# Patient Record
Sex: MALE | Race: WHITE | NOT HISPANIC OR LATINO | Employment: FULL TIME | URBAN - NONMETROPOLITAN AREA
[De-identification: names, ages, dates, MRNs, and addresses within clinical notes are randomized per-mention and may not be internally consistent; named-entity substitution may affect disease eponyms.]

---

## 2018-11-03 ENCOUNTER — OFFICE VISIT (OUTPATIENT)
Dept: URGENT CARE | Facility: PHYSICIAN GROUP | Age: 23
End: 2018-11-03
Payer: COMMERCIAL

## 2018-11-03 VITALS
BODY MASS INDEX: 36.96 KG/M2 | OXYGEN SATURATION: 94 % | WEIGHT: 230 LBS | TEMPERATURE: 97.8 F | SYSTOLIC BLOOD PRESSURE: 130 MMHG | HEIGHT: 66 IN | RESPIRATION RATE: 16 BRPM | DIASTOLIC BLOOD PRESSURE: 84 MMHG | HEART RATE: 76 BPM

## 2018-11-03 DIAGNOSIS — J45.21 MILD INTERMITTENT ASTHMA WITH ACUTE EXACERBATION: ICD-10-CM

## 2018-11-03 PROCEDURE — 99203 OFFICE O/P NEW LOW 30 MIN: CPT | Performed by: FAMILY MEDICINE

## 2018-11-03 RX ORDER — ALBUTEROL SULFATE 90 UG/1
AEROSOL, METERED RESPIRATORY (INHALATION)
Qty: 1 INHALER | Refills: 5 | Status: SHIPPED | OUTPATIENT
Start: 2018-11-03 | End: 2020-08-22

## 2018-11-03 NOTE — PROGRESS NOTES
Chief Complaint:    Chief Complaint   Patient presents with   • Asthma       History of Present Illness:    This is a new problem. He has long history of asthma since childhood. His asthma started acting up last night. He does not have any meds for asthma at this time. Usually all he needs is Albuterol MDI to use and he will be fine. Rarely has he needed steroid or neb treatment. He does not feel he needs either today. No fever or purulent mucus.      Review of Systems:    Constitutional: Negative for fever, chills, and diaphoresis.   Eyes: Negative for change in vision, photophobia, pain, redness, and discharge.  ENT: Negative for ear pain, ear discharge, hearing loss, tinnitus, nasal congestion, nosebleeds, and sore throat.    Respiratory: See HPI.   Cardiovascular: Negative for chest pain, palpitations, orthopnea, claudication, leg swelling, and PND.   Gastrointestinal: Negative for abdominal pain, nausea, vomiting, diarrhea, constipation, blood in stool, and melena.   Musculoskeletal: Negative for myalgias, joint pain, neck pain, and back pain.   Skin: Negative for rash and itching.       Past Medical History:    Past Medical History:   Diagnosis Date   • Asthma      Past Surgical History:    History reviewed. No pertinent surgical history.    Social History:    Social History     Social History   • Marital status: Single     Spouse name: N/A   • Number of children: N/A   • Years of education: N/A     Occupational History   • Not on file.     Social History Main Topics   • Smoking status: Never Smoker   • Smokeless tobacco: Never Used   • Alcohol use Not on file   • Drug use: Yes     Types: Marijuana   • Sexual activity: Not on file     Other Topics Concern   • Not on file     Social History Narrative   • No narrative on file     Family History:    History reviewed. No pertinent family history.    Medications:    No current outpatient prescriptions on file prior to visit.     No current facility-administered  "medications on file prior to visit.      Allergies:    No Known Allergies      Vitals:    Vitals:    11/03/18 0928   BP: 130/84   BP Location: Left arm   Patient Position: Sitting   BP Cuff Size: Adult   Pulse: 76   Resp: 16   Temp: 36.6 °C (97.8 °F)   TempSrc: Temporal   SpO2: 94%   Weight: 104.3 kg (230 lb)   Height: 1.664 m (5' 5.5\")       Physical Exam:    Constitutional: Vital signs reviewed. Appears well-developed and well-nourished. No acute distress.   Eyes: Sclera white, conjunctivae clear.   ENT: External ears normal. Hearing normal.   Neck: Neck supple.   Cardiovascular: Regular rate and rhythm. No murmur.  Pulmonary/Chest: Respirations non-labored. Mild diffuse wheezing bilaterally.  Musculoskeletal: Normal gait. No muscular atrophy or weakness.  Neurological: Alert and oriented to person, place, and time. Muscle tone normal. Coordination normal.   Psychiatric: Normal mood and affect. Behavior is normal. Judgment and thought content normal.       Assessment / Plan:    1. Mild intermittent asthma with acute exacerbation  - albuterol 108 (90 Base) MCG/ACT Aero Soln inhalation aerosol; 2 PUFFS EVERY 4 HOURS ONLY IF NEEDED FOR COUGH, WHEEZING, OR SHORTNESS OF BREATH.  Dispense: 1 Inhaler; Refill: 5      Discussed with him DDX, management options, and risks, benefits, and alternatives to treatment plan agreed upon.    Agreeable to medication prescribed.    Discussed expected course of duration, time for improvement, and to seek follow-up in Emergency Room, urgent care, or with PCP if getting worse at any time or not improving within expected time frame.  "

## 2019-01-22 ENCOUNTER — OFFICE VISIT (OUTPATIENT)
Dept: URGENT CARE | Facility: PHYSICIAN GROUP | Age: 24
End: 2019-01-22
Payer: COMMERCIAL

## 2019-01-22 VITALS
RESPIRATION RATE: 16 BRPM | WEIGHT: 237 LBS | SYSTOLIC BLOOD PRESSURE: 116 MMHG | HEIGHT: 66 IN | HEART RATE: 96 BPM | TEMPERATURE: 98 F | DIASTOLIC BLOOD PRESSURE: 72 MMHG | BODY MASS INDEX: 38.09 KG/M2 | OXYGEN SATURATION: 92 %

## 2019-01-22 DIAGNOSIS — J98.8 WHEEZING-ASSOCIATED RESPIRATORY INFECTION (WARI): ICD-10-CM

## 2019-01-22 PROCEDURE — 99214 OFFICE O/P EST MOD 30 MIN: CPT | Performed by: PHYSICIAN ASSISTANT

## 2019-01-22 RX ORDER — ALBUTEROL SULFATE 2.5 MG/3ML
2.5 SOLUTION RESPIRATORY (INHALATION) EVERY 4 HOURS PRN
Qty: 50 BULLET | Refills: 0 | Status: SHIPPED | OUTPATIENT
Start: 2019-01-22

## 2019-01-22 RX ORDER — METHYLPREDNISOLONE 4 MG/1
4 TABLET ORAL SEE ADMIN INSTRUCTIONS
Qty: 21 TAB | Refills: 0 | Status: SHIPPED | OUTPATIENT
Start: 2019-01-22 | End: 2020-08-22

## 2019-01-22 RX ORDER — AZITHROMYCIN 250 MG/1
TABLET, FILM COATED ORAL
Qty: 6 TAB | Refills: 0 | Status: SHIPPED | OUTPATIENT
Start: 2019-01-22 | End: 2020-08-22

## 2019-01-22 NOTE — PROGRESS NOTES
Chief Complaint   Patient presents with   • Shortness of Breath       HISTORY OF PRESENT ILLNESS: Patient is a 23 y.o. male who presents today because he has had increasing shortness of breath over the last 3 days.  He has a long history of asthma, has been using his albuterol inhaler but has not been helping very much.    There are no active problems to display for this patient.      Allergies:Patient has no known allergies.    Current Outpatient Prescriptions Ordered in [facility]   Medication Sig Dispense Refill   • albuterol (PROVENTIL) 2.5mg/3ml Nebu Soln solution for nebulization 3 mL by Nebulization route every four hours as needed for Shortness of Breath. 50 Bullet 0   • MethylPREDNISolone (MEDROL DOSEPAK) 4 MG Tablet Therapy Pack Take 1 Tab by mouth See Admin Instructions. 21 Tab 0   • azithromycin (ZITHROMAX) 250 MG Tab Follow package directions 6 Tab 0   • albuterol 108 (90 Base) MCG/ACT Aero Soln inhalation aerosol 2 PUFFS EVERY 4 HOURS ONLY IF NEEDED FOR COUGH, WHEEZING, OR SHORTNESS OF BREATH. 1 Inhaler 5     No current Epic-ordered facility-administered medications on file.        Past Medical History:   Diagnosis Date   • Asthma        Social History   Substance Use Topics   • Smoking status: Never Smoker   • Smokeless tobacco: Never Used   • Alcohol use Not on file       No family status information on file.   No family history on file.    ROS:  Review of Systems   Constitutional: Negative for fever, chills, weight loss and malaise/fatigue.   HENT: Negative for ear pain, nosebleeds, congestion, sore throat and neck pain.    Eyes: Negative for blurred vision.   Respiratory: Positive for cough, sputum production, shortness of breath and wheezing.    Cardiovascular: Negative for chest pain, palpitations, orthopnea and leg swelling.   Gastrointestinal: Negative for heartburn, nausea, vomiting and abdominal pain.   Genitourinary: Negative for dysuria, urgency and frequency.     Exam:  Blood pressure 116/72,  "pulse 96, temperature 36.7 °C (98 °F), temperature source Temporal, resp. rate 16, height 1.664 m (5' 5.5\"), weight 107.5 kg (237 lb), SpO2 92 %.  General:  Well nourished, well developed male in NAD  Head:Normocephalic, atraumatic  Eyes: PERRLA, EOM within normal limits, no conjunctival injection, no scleral icterus, visual fields and acuity grossly intact.  Ears: Normal shape and symmetry, no tenderness, no discharge. External canals are without any significant edema or erythema. Tympanic membranes are without any inflammation, no effusion. Gross auditory acuity is intact  Nose: Symmetrical without tenderness, no discharge.  Mouth: reasonable hygiene, no erythema exudates or tonsillar enlargement.  Neck: no masses, range of motion within normal limits, no tracheal deviation. No obvious thyroid enlargement.  Pulmonary: chest is symmetrical with respiration, is diffuse wheezing bilaterally, scattered rhonchi bilaterally, not clearing with cough   cardiovascular: regular rate and rhythm without murmurs, rubs, or gallops.  Extremities: no clubbing, cyanosis, or edema.    Please note that this dictation was created using voice recognition software. I have made every reasonable attempt to correct obvious errors, but I expect that there are errors of grammar and possibly content that I did not discover before finalizing the note.    Assessment/Plan:  1. Wheezing-associated respiratory infection (WARI)  albuterol (PROVENTIL) 2.5mg/3ml Nebu Soln solution for nebulization    MethylPREDNISolone (MEDROL DOSEPAK) 4 MG Tablet Therapy Pack    azithromycin (ZITHROMAX) 250 MG Tab       Followup with primary care in the next 7-10 days if not significantly improving, return to the urgent care or go to the emergency room sooner for any worsening of symptoms.       "

## 2019-01-22 NOTE — LETTER
January 22, 2019         Patient: Jose Alejandro Kilpatrick   YOB: 1995   Date of Visit: 1/22/2019           To Whom it May Concern:    Jose Alejandro Kilpatrick was seen in my clinic on 1/22/2019. He may return to work on 01/23/2019, please excuse any recent absences.    If you have any questions or concerns, please don't hesitate to call.        Sincerely,           Ga Ramos P.A.-C.  Electronically Signed

## 2020-08-22 ENCOUNTER — OFFICE VISIT (OUTPATIENT)
Dept: URGENT CARE | Facility: PHYSICIAN GROUP | Age: 25
End: 2020-08-22
Payer: COMMERCIAL

## 2020-08-22 VITALS
RESPIRATION RATE: 18 BRPM | DIASTOLIC BLOOD PRESSURE: 88 MMHG | SYSTOLIC BLOOD PRESSURE: 140 MMHG | BODY MASS INDEX: 33.75 KG/M2 | TEMPERATURE: 97.7 F | OXYGEN SATURATION: 97 % | HEART RATE: 60 BPM | WEIGHT: 210 LBS | HEIGHT: 66 IN

## 2020-08-22 DIAGNOSIS — J45.21 MILD INTERMITTENT ASTHMA WITH ACUTE EXACERBATION: ICD-10-CM

## 2020-08-22 PROCEDURE — 99214 OFFICE O/P EST MOD 30 MIN: CPT | Performed by: FAMILY MEDICINE

## 2020-08-22 RX ORDER — ALBUTEROL SULFATE 90 UG/1
AEROSOL, METERED RESPIRATORY (INHALATION)
Qty: 8.5 G | Refills: 5 | Status: SHIPPED | OUTPATIENT
Start: 2020-08-22 | End: 2021-03-17 | Stop reason: SDUPTHER

## 2020-08-22 NOTE — LETTER
August 22, 2020         Patient: Jose Alejandro Kilpatrick   YOB: 1995   Date of Visit: 8/22/2020           To Whom it May Concern:    Jose Alejandro Kilpatrick was seen in my clinic on 8/22/2020.     Please excuse from work for 8/22/2020 due to Asthma.    If you have any questions or concerns, please don't hesitate to call.        Sincerely,           Artemio He M.D.  Electronically Signed
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
## 2021-03-17 ENCOUNTER — OFFICE VISIT (OUTPATIENT)
Dept: URGENT CARE | Facility: PHYSICIAN GROUP | Age: 26
End: 2021-03-17
Payer: COMMERCIAL

## 2021-03-17 VITALS
SYSTOLIC BLOOD PRESSURE: 128 MMHG | OXYGEN SATURATION: 97 % | HEIGHT: 66 IN | HEART RATE: 84 BPM | TEMPERATURE: 98.2 F | WEIGHT: 204 LBS | RESPIRATION RATE: 16 BRPM | DIASTOLIC BLOOD PRESSURE: 84 MMHG | BODY MASS INDEX: 32.78 KG/M2

## 2021-03-17 DIAGNOSIS — J45.21 MILD INTERMITTENT ASTHMA WITH ACUTE EXACERBATION: ICD-10-CM

## 2021-03-17 PROCEDURE — 99214 OFFICE O/P EST MOD 30 MIN: CPT | Performed by: PHYSICIAN ASSISTANT

## 2021-03-17 RX ORDER — ALBUTEROL SULFATE 90 UG/1
AEROSOL, METERED RESPIRATORY (INHALATION)
Qty: 18 G | Refills: 2 | Status: SHIPPED | OUTPATIENT
Start: 2021-03-17

## 2021-03-17 NOTE — LETTER
March 17, 2021         Patient: Jose Alejandro Kilpatrick   YOB: 1995   Date of Visit: 3/17/2021           To Whom it May Concern:    Jose Alejandro Kilpatrick was seen in my clinic on 3/17/2021. He may return to work on 3/18/2021.  He has a medical condition that may be exacerbated by use of a mask.  I have recommended that he limit his mask wearing at work to 60 or less hours a week.    If you have any questions or concerns, please don't hesitate to call.        Sincerely,           Ga Ramos P.A.-C.  Electronically Signed

## 2021-03-17 NOTE — PROGRESS NOTES
Chief Complaint   Patient presents with   • Medication Refill     inhaler       HISTORY OF PRESENT ILLNESS: Patient is a 26 y.o. male who presents today because of mild persistent asthma and has been having some difficulty with wearing his mask at work.  He works 80 hours a week and feels like 80 hours is too much to wear the mask because it has given him some exacerbations of his asthma.  He is okay wearing it for up to 12 hours at a time but needs a break from it.  He is almost out of his albuterol inhaler and is requesting a refill also    There are no problems to display for this patient.      Allergies:Patient has no known allergies.    Current Outpatient Medications Ordered in Epic   Medication Sig Dispense Refill   • albuterol 108 (90 Base) MCG/ACT Aero Soln inhalation aerosol 2 PUFFS EVERY 4 HOURS ONLY IF NEEDED FOR COUGH, WHEEZING, OR SHORTNESS OF BREATH. 18 g 2   • albuterol (PROVENTIL) 2.5mg/3ml Nebu Soln solution for nebulization 3 mL by Nebulization route every four hours as needed for Shortness of Breath. 50 Bullet 0     No current Epic-ordered facility-administered medications on file.       Past Medical History:   Diagnosis Date   • Asthma        Social History     Tobacco Use   • Smoking status: Never Smoker   • Smokeless tobacco: Never Used   Substance Use Topics   • Alcohol use: Not on file   • Drug use: Yes     Types: Marijuana       No family status information on file.   History reviewed. No pertinent family history.    ROS:  Review of Systems   Constitutional: Negative for fever, chills, weight loss and malaise/fatigue.   HENT: Negative for ear pain, nosebleeds, congestion, sore throat and neck pain.    Eyes: Negative for blurred vision.   Respiratory: Positive for occasional cough, no sputum production, positive for episodic shortness of breath and wheezing.    Cardiovascular: Negative for chest pain, palpitations, orthopnea and leg swelling.   Gastrointestinal: Negative for heartburn,  "nausea, vomiting and abdominal pain.   Genitourinary: Negative for dysuria, urgency and frequency.     Exam:  /84 (BP Location: Right arm, Patient Position: Sitting, BP Cuff Size: Adult)   Pulse 84   Temp 36.8 °C (98.2 °F) (Temporal)   Resp 16   Ht 1.664 m (5' 5.5\")   Wt 92.5 kg (204 lb)   SpO2 97%   General:  Well nourished, well developed male in NAD  Head:Normocephalic, atraumatic  Eyes: PERRLA, EOM within normal limits, no conjunctival injection, no scleral icterus, visual fields and acuity grossly intact.  Nose: Symmetrical without tenderness, no discharge.  Mouth: reasonable hygiene, no erythema exudates or tonsillar enlargement.  Neck: no masses, range of motion within normal limits, no tracheal deviation. No obvious thyroid enlargement.  Pulmonary: chest is symmetrical with respiration, no wheezes, crackles, or rhonchi.  Cardiovascular: regular rate and rhythm without murmurs, rubs, or gallops.  Extremities: no clubbing, cyanosis, or edema.    Please note that this dictation was created using voice recognition software. I have made every reasonable attempt to correct obvious errors, but I expect that there are errors of grammar and possibly content that I did not discover before finalizing the note.    Assessment/Plan:  1. Mild intermittent asthma with acute exacerbation  albuterol 108 (90 Base) MCG/ACT Aero Soln inhalation aerosol       Followup with primary care in the next 7-10 days if not significantly improving, return to the urgent care or go to the emergency room sooner for any worsening of symptoms.       "

## 2025-03-17 ENCOUNTER — OFFICE VISIT (OUTPATIENT)
Dept: URGENT CARE | Facility: PHYSICIAN GROUP | Age: 30
End: 2025-03-17

## 2025-03-17 VITALS
SYSTOLIC BLOOD PRESSURE: 118 MMHG | DIASTOLIC BLOOD PRESSURE: 70 MMHG | WEIGHT: 208.2 LBS | OXYGEN SATURATION: 97 % | HEART RATE: 75 BPM | TEMPERATURE: 96.3 F | BODY MASS INDEX: 34.12 KG/M2 | RESPIRATION RATE: 16 BRPM

## 2025-03-17 DIAGNOSIS — R06.2 WHEEZING: ICD-10-CM

## 2025-03-17 DIAGNOSIS — Z76.0 MEDICATION REFILL: ICD-10-CM

## 2025-03-17 DIAGNOSIS — J45.909 MILD ASTHMA WITHOUT COMPLICATION, UNSPECIFIED WHETHER PERSISTENT: ICD-10-CM

## 2025-03-17 PROCEDURE — 99213 OFFICE O/P EST LOW 20 MIN: CPT

## 2025-03-17 PROCEDURE — 3074F SYST BP LT 130 MM HG: CPT

## 2025-03-17 PROCEDURE — 3078F DIAST BP <80 MM HG: CPT

## 2025-03-17 RX ORDER — ALBUTEROL SULFATE 90 UG/1
2 INHALANT RESPIRATORY (INHALATION) EVERY 6 HOURS PRN
Qty: 8.5 G | Refills: 0 | Status: SHIPPED | OUTPATIENT
Start: 2025-03-17

## 2025-03-17 ASSESSMENT — ENCOUNTER SYMPTOMS
RESPIRATORY NEGATIVE: 1
CARDIOVASCULAR NEGATIVE: 1
EYES NEGATIVE: 1
CONSTITUTIONAL NEGATIVE: 1
NEUROLOGICAL NEGATIVE: 1
GASTROINTESTINAL NEGATIVE: 1

## 2025-03-17 NOTE — PROGRESS NOTES
Subjective:   Chief Complaint  Jose Alejandro Kilpatrick is a 30 y.o. male who presents for Asthma (Asthma- working on getting insurance. Been using nebulizer albuterol. Wants rescue inhaler. Used in the past. )      History of Present Illness  Patient presents requesting a medication refill.  He states that he has asthma but has not been able to obtain an albuterol prescription because he has not had insurance.  He states he just now has insurance and is attempting to establish with a primary care physician.  He states throughout the year he will usually use his albuterol inhaler 4 times and he will use his nebulizer 2x in a year.  He currently denies any shortness of breath or difficulty breathing.      Asthma  His past medical history is significant for asthma.       Review of Systems  Review of Systems   Constitutional: Negative.    HENT: Negative.     Eyes: Negative.    Respiratory: Negative.     Cardiovascular: Negative.    Gastrointestinal: Negative.    Skin: Negative.    Neurological: Negative.        Past Medical History  Past Medical History:   Diagnosis Date    Asthma        Family History  No family history on file.    Social History  Social History     Socioeconomic History    Marital status: Single   Tobacco Use    Smoking status: Never    Smokeless tobacco: Never   Vaping Use    Vaping status: Never Used   Substance and Sexual Activity    Alcohol use: Yes     Comment: weekends occ    Drug use: Yes     Types: Marijuana       Surgical History  No past surgical history on file.    Current Medications  Home Medications       Reviewed by Iglesia Mcclelland't (Medical Assistant) on 03/17/25 at 1245  Med List Status: <None>     Medication Last Dose Status   albuterol (PROVENTIL) 2.5mg/3ml Nebu Soln solution for nebulization PRN Active   albuterol 108 (90 Base) MCG/ACT Aero Soln inhalation aerosol PRN Active                    Allergies  No Known Allergies       Objective:     /70   Pulse 75   Temp  (!) 35.7 °C (96.3 °F) (Temporal)   Resp 16   Wt 94.4 kg (208 lb 3.2 oz)   SpO2 97%     Physical Exam  Constitutional:       Appearance: Normal appearance.   HENT:      Head: Normocephalic and atraumatic.      Nose: Nose normal.      Mouth/Throat:      Mouth: Mucous membranes are moist.   Cardiovascular:      Rate and Rhythm: Normal rate and regular rhythm.      Pulses: Normal pulses.      Heart sounds: Normal heart sounds.   Pulmonary:      Effort: Pulmonary effort is normal.      Breath sounds: Normal breath sounds.   Neurological:      Mental Status: He is alert.         Assessment/Plan:     Diagnosis  1. Wheezing  - albuterol 108 (90 Base) MCG/ACT Aero Soln inhalation aerosol; Inhale 2 Puffs every 6 hours as needed for Shortness of Breath.  Dispense: 8.5 g; Refill: 0    2. Medication refill  - albuterol 108 (90 Base) MCG/ACT Aero Soln inhalation aerosol; Inhale 2 Puffs every 6 hours as needed for Shortness of Breath.  Dispense: 8.5 g; Refill: 0    3. Mild asthma without complication, unspecified whether persistent  - albuterol 108 (90 Base) MCG/ACT Aero Soln inhalation aerosol; Inhale 2 Puffs every 6 hours as needed for Shortness of Breath.  Dispense: 8.5 g; Refill: 0        MDM/Plan/Discussion  At this time the patient is not reporting any difficulty breathing or shortness of breath.  There are no wheezes noted on physical exam and he does not look as if he is in acute distress.  He will be prescribed an albuterol rescue inhaler.  Patient advised that he establish with a PCP to help further manage any future asthma and medication issues.  Patient verbalizes understanding        Differential diagnosis, natural history, supportive care, and indications for immediate follow-up discussed.    Advised the patient to follow-up with the primary care physician for recheck, reevaluation, and consideration of further management.    Advised the patient to return or seek immediate medical attention if symptoms worsen or  new symptoms develop    Please note that this dictation was created using voice recognition software. I have made a reasonable attempt to correct obvious errors, but I expect that there are errors of grammar and possibly content that I did not discover before finalizing the note.    This note was electronically signed by MICHAEL Lester

## 2025-03-17 NOTE — LETTER
[facility]  [address]  [facility] [address]     March 17, 2025    Patient: Jose Alejandro Kilpatrick   YOB: 1995   Date of Visit: 3/17/2025       To Whom It May Concern:    Jose Alejandro Kilpatrick was seen and treated in our department on 3/17/2025. Please excuse him from work today. He may return tomorrow, 3/18/2025.    Sincerely,     LORNA Lester.

## 2025-05-07 ENCOUNTER — OCCUPATIONAL MEDICINE (OUTPATIENT)
Dept: URGENT CARE | Facility: PHYSICIAN GROUP | Age: 30
End: 2025-05-07
Payer: COMMERCIAL

## 2025-05-07 VITALS
BODY MASS INDEX: 34.4 KG/M2 | RESPIRATION RATE: 16 BRPM | TEMPERATURE: 98.7 F | SYSTOLIC BLOOD PRESSURE: 122 MMHG | WEIGHT: 219.2 LBS | HEART RATE: 62 BPM | OXYGEN SATURATION: 96 % | HEIGHT: 67 IN | DIASTOLIC BLOOD PRESSURE: 80 MMHG
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
## 2025-05-07 DIAGNOSIS — S56.912A FOREARM STRAIN, LEFT, INITIAL ENCOUNTER: ICD-10-CM

## 2025-05-07 DIAGNOSIS — S46.212A BICEPS STRAIN, LEFT, INITIAL ENCOUNTER: ICD-10-CM

## 2025-05-07 PROCEDURE — 3079F DIAST BP 80-89 MM HG: CPT | Performed by: NURSE PRACTITIONER

## 2025-05-07 PROCEDURE — 99214 OFFICE O/P EST MOD 30 MIN: CPT | Performed by: NURSE PRACTITIONER

## 2025-05-07 PROCEDURE — 3074F SYST BP LT 130 MM HG: CPT | Performed by: NURSE PRACTITIONER

## 2025-05-07 NOTE — PROGRESS NOTES
"Subjective:     Jose Alejandro Kilpatrick is a 30 y.o. male who presents for Arm Pain (Went to lift a tired and pulled a muscle in L arm, Bruising, swelling, hurts to flex, hurts to left anything up, happened yesterdays )    DOI: 05/06/2025 LEXIE: Lifting A Tire with my Left Arm     Visit 1: This is an acute condition.  Patient reports that yesterday while lifting a tire he felt a pop and sharp pain in his left bicep and forearm.  Later that evening patient noticed a bruised area to his left medial forearm near his elbow.  Patient also notices a dimpling of his left bicep.  Patient states that he does have exacerbation of pain with lifting and rotation of his elbow.  He denies any numbness or tingling running down his arm or loss of  strength.  Patient did take over-the-counter Tylenol and Motrin which has helped.  Patient denies any previous injury or trauma to this arm.  Patient is right arm dominant.  Patient denies second job.    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  Tenderness to palpation of medial epicondyles as well as bicep proximal bicep insertion with dimpling present.  Patient does have flexion of bicep and forearm however he states that elicits pain.   strength 5 out of 5.  Distal neurovascular intact.    Assessment/Plan:       1. Work related injury    2. Biceps strain, left, initial encounter  - Referral to Occupational Medicine    3. Forearm strain, left, initial encounter  - Referral to Occupational Medicine    Recommended cryo and heat therapy.  May take " over-the-counter Tylenol and/or NSAIDs.  Did discuss and offer sling use intermittently however patient politely declined.  Discussed general range of motion exercises.  I am concerned for possible bicep tendon and/or forearm tendon tears versus strain.  At this time patient placed on work restrictions indicating no use of left arm.  Stat referral placed to occupational medicine for follow-up.    Differential diagnosis, natural history, supportive care, and indications for immediate follow-up discussed.    Approximately 35 minutes were spent in reviewing notes, preparing for visit, obtaining history, exam and evaluation, patient counseling/education and post visit documentation/orders.

## 2025-05-07 NOTE — LETTER
PHYSICIAN’S AND CHIROPRACTIC PHYSICIAN'S   PROGRESS REPORT   CERTIFICATION OF DISABILITY Claim Number:     Social Security Number:    Patient’s Name: Jose Alejandro Kilpatrick Date of Injury: 5/6/2025   Employer:  [employer] Name of MCO (if applicable):      Patient’s Job Description/Occupation: Sales and Service       Previous Injuries/Diseases/Surgeries Contributing to the Condition:  NO      Diagnosis: (Y99.0) Work related injury  (S46.212A) Biceps strain, left, initial encounter  (S56.912A) Forearm strain, left, initial encounter      Related to the Industrial Injury? Yes     Explain: DOI: 05/06/2025 LEXIE: Lifting A Tire with my Left Arm     Visit 1: This is an acute condition.  Patient reports that yesterday while lifting a tire he felt a pop and sharp pain in his left bicep and forearm.  Later that evening patient noticed a bruised area to his left medial forearm near his elbow.  Patient also notices a dimpling of his left bicep.  Patient states that he does have exacerbation of pain with lifting and rotation of his elbow.  He denies any numbness or tingling running down his arm or loss of  strength.  Patient did take over-the-counter Tylenol and Motrin which has helped.  Patient denies any previous injury or trauma to this arm.  Patient is right arm dominant.  Patient denies second job      Objective Medical Findings: Left arm: Bruising and tenderness noted on left medial forearm near elbow.  Tenderness to palpation of medial epicondyles as well as bicep proximal bicep insertion with dimpling present.  Patient does have flexion of bicep and forearm however he states that elicits pain.   strength 5 out of 5.  Distal neurovascular intact.         None - Discharged                         Stable  Yes                 Ratable  No        Generally Improved                         Condition Worsened                  Condition Same  May Have Suffered a Permanent Disability No     Treatment Plan:    Recommended  cryo and heat therapy.  May take over-the-counter Tylenol and/or NSAIDs.  Did discuss and offer sling use intermittently however patient politely declined.  Discussed general range of motion exercises.  I am concerned for possible bicep tendon and/or forearm tendon tears versus strain.  At this time patient placed on work restrictions indicating no use of left arm.  Stat referral placed to occupational medicine for follow-up.         No Change in Therapy                  PT/OT Prescribed                      Medication May be Used While Working        Case Management                          PT/OT Discontinued    Consultation    Further Diagnostic Studies:    Prescription(s)                 Released to FULL DUTY /No Restrictions on (Date):       Certified TOTALLY TEMPORARILY DISABLED (Indicate Dates) From:   To:    X  Released to RESTRICTED/Modified Duty on (Date): From: 5/7/2025 To: 5/13/2025  Restrictions Are:         No Sitting    No Standing    No Pulling Other: No use of left arm       No Bending at Waist     No Stooping     No Lifting        No Carrying     No Walking Lifting Restricted to (lbs.):          No Pushing        No Climbing     No Reaching Above Shoulders       Date of Next Visit:  5/13/2025  With occupational medicine renal Date of this Exam: 5/7/2025 Physician/Chiropractic Physician Name: MICHAEL Quiros Physician/Chiropractic Physician Signature:  Alberto Santiago DO MPH     [place]:  [address] [place] [address], [address] [place], [place] [address] - Telephone [phone] [place]:  [address]  [facility], [address] [place], [place] [address] - Telephone [phone]    [email or web address]  D-39 (Rev. 10/24)

## 2025-05-07 NOTE — LETTER
"  EMPLOYEE’S CLAIM FOR COMPENSATION/ REPORT OF INITIAL TREATMENT  FORM C-4  PLEASE TYPE OR PRINT    EMPLOYEE’S CLAIM - PROVIDE ALL INFORMATION REQUESTED   First Name                    CANDICE Blount                  Last Name  Finesse Birthdate                    1995                Sex  [x]Male Claim Number (Insurer’s Use Only)     Mailing Address  [address] Age  30 y.o. 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 (if applicable)  Changing a Tire    How did this injury or occupational disease occur? (Be specific and answer in detail. Use additional sheet if necessary)  Lifting A Tire with my Left Arm   If you believe that you have an occupational disease, when did you first have knowledge of the disability and its relationship to your employment?  N/A Witnesses to the Accident (if applicable)  Sharan Langley      Nature of Injury or Occupational Disease  Workers' Compensation  Part(s) of Body Injured or Affected  Upper Arm (L) Lower Arm (L) N/A    I CERTIFY THAT THE ABOVE IS TRUE AND CORRECT TO " T HE BEST OF MY KNOWLEDGE AND THAT I HAVE PROVIDED THIS INFORMATION IN ORDER TO OBTAIN THE BENEFITS OF [place] AND [place]CCUP[place], OR CHAPTER [place]).  I HEREBY AUTHORIZE ANY PHYSICIAN, CHIROPRACTOR, SURGEON, PRACTITIONER OR ANY OTHER PERSON, ANY HOSPITAL, INCLUDING [facility] OR [facility], ANY  MEDICAL SERVICE ORGANIZATION, ANY INSURANCE COMPANY, OR OTHER INSTITUTION OR ORGANIZATION TO RELEASE TO EACH OTHER, ANY MEDICAL OR OTHER INFORMATION, INCLUDING BENEFITS PAID OR PAYABLE, PERTINENT TO THIS INJURY OR DISEASE, EXCEPT INFORMATION RELATIVE TO DIAGNOSIS, TREATMENT AND/OR COUNSELING FOR AIDS, PSYCHOLOGICAL CONDITIONS, ALCOHOL OR CONTROLLED SUBSTANCES, FOR WHICH I MUST GIVE SPECIFIC AUTHORIZATION.  A PHOTOSTAT OF THIS AUTHORIZATION SHALL BE VALID AS THE ORIGINAL.     Date 05.07.25   [facility]  Employee’s Original or  *Electronic Signature   THIS REPORT MUST BE COMPLETED AND MAILED WITHIN 3 WORKING DAYS OF TREATMENT   [facility] [place]    Name of Facility  [place]   Date 5/7/2025 Diagnosis and Description of Injury or Occupational Disease  (Y99.0) Work related injury  (S46.212A) Biceps strain, left, initial encounter  (S56.912A) Forearm strain, left, initial encounter  Diagnoses of Work related injury, Biceps strain, left, initial encounter, and Forearm strain, left, initial encounter were pertinent to this visit. Is there evidence that the injured employee was under the influence of alcohol and/or another controlled substance at the time of accident?  []No  [] Yes (if yes, please explain)   Hour 11:42 AM  No   Treatment: Recommended cryo and heat therapy.  May take over-the-counter Tylenol and/or NSAIDs.  Did discuss and offer sling use intermittently however patient politely declined.  Discussed general range of motion exercises.      Have you advised the patient to remain off work five days or more?    No  [] Yes  If yes, indicate dates: From_ _                                                      To __ _  [] No   If no, is the injured employee capable of: [] full duty No   [] modified duty Yes    If modified duty, specify any limitations / restrictions:__________________  ___No use of left arm___________________________     X-Ray Findings:      From information given by the employee, together with medical evidence, can you directly connect this injury or occupational disease as job incurred?  []Yes   [] No Yes    Is additional medical care by a physician indicated? []Yes [] No  Yes    Do you know of any previous injury or disease contributing to this condition or occupational disease? []Yes [] No (Explain if yes)                          No   Date  5/7/2025 Print Health Care Provider’s Name  MICHAEL Quiros I certify that the employer’s copy of  this form was delivered to the employer on:   Address  [address] INSURER'S USE ONLY                       [place]  [address] Provider’s Tax ID Number  [id number]   Telephone  Dept: [phone]    Health Care Provider’s Original or Electronic Signature      e-FARRAH [place].P.R.NMarcela    Degree (MD,DO, DC,PA-C,APRN)  APRN        ORIGINAL - TREATING HEALTHCARE PROVIDER PAGE 2 - INSURER/TPA PAGE 3 - EMPLOYER PAGE 4 - EMPLOYEE             Form C-4 (rev.02/25)

## 2025-05-13 ENCOUNTER — OCCUPATIONAL MEDICINE (OUTPATIENT)
Dept: OCCUPATIONAL MEDICINE | Facility: CLINIC | Age: 30
End: 2025-05-13
Payer: COMMERCIAL

## 2025-05-13 VITALS
BODY MASS INDEX: 34.53 KG/M2 | TEMPERATURE: 97.3 F | HEIGHT: 67 IN | RESPIRATION RATE: 14 BRPM | SYSTOLIC BLOOD PRESSURE: 140 MMHG | DIASTOLIC BLOOD PRESSURE: 80 MMHG | WEIGHT: 220 LBS | HEART RATE: 74 BPM | OXYGEN SATURATION: 98 %

## 2025-05-13 DIAGNOSIS — S46.212D STRAIN OF LEFT BICEPS, SUBSEQUENT ENCOUNTER: ICD-10-CM

## 2025-05-13 DIAGNOSIS — S56.912D STRAIN OF LEFT FOREARM, SUBSEQUENT ENCOUNTER: ICD-10-CM

## 2025-05-13 PROCEDURE — 99213 OFFICE O/P EST LOW 20 MIN: CPT | Performed by: NURSE PRACTITIONER

## 2025-05-13 ASSESSMENT — PAIN SCALES - GENERAL: PAINLEVEL_OUTOF10: NO PAIN

## 2025-05-13 NOTE — LETTER
PHYSICIAN’S AND CHIROPRACTIC PHYSICIAN'S   PROGRESS REPORT   CERTIFICATION OF DISABILITY Claim Number:     Social Security Number:    Patient’s Name: Jose Alejandro Kilpatrick Date of Injury: 5/6/2025   Employer: [employer]  Name of MCO (if applicable):      Patient’s Job Description/Occupation: Sales and Service       Previous Injuries/Diseases/Surgeries Contributing to the Condition:         Diagnosis: (S46.212D) Strain of left biceps, subsequent encounter  (S56.916W) Strain of left forearm, subsequent encounter      Related to the Industrial Injury? Yes     Explain: DOI: 05/06/2025 LEXIE: Lifting A Tire with my Left Arm       Objective Medical Findings: Left arm: Bruising and tenderness noted on left medial forearm near elbow.  Tenderness to palpation of medial epicondyles as well as bicep proximal bicep insertion with dimpling present.  Patient does have flexion of bicep and forearm however he states that elicits pain.   strength 5 out of 5.  Distal neurovascular intact.         None - Discharged                         Stable  Yes                 Ratable  No        Generally Improved                         Condition Worsened               X   Condition Same  May Have Suffered a Permanent Disability No     Treatment Plan:    Follow-up in 3 weeks, unless seen by orthopedics  Continue with OTC Tylenol/ibuprofen if needed, ice/heat application,  and gentle ROM as tolerated  Return to clinic sooner if needed for further evaluation and management of new or worsening symptoms           No Change in Therapy                  PT/OT Prescribed                      Medication May be Used While Working        Case Management                          PT/OT Discontinued  X  Consultation    Further Diagnostic Studies:    Prescription(s) Orthopedic referral placed, transfer care    MRI of the elbow and forearm requested          Released to FULL DUTY /No Restrictions on (Date):       Certified TOTALLY TEMPORARILY  DISABLED (Indicate Dates) From:   To:    X  Released to RESTRICTED/Modified Duty on (Date): From: 5/13/2025 To: 6/5/2025   Restrictions Are:  Temporary      No Sitting    No Standing    No Pulling Other: No use of the left upper extremity until cleared by orthopedics       No Bending at Waist     No Stooping     No Lifting        No Carrying     No Walking Lifting Restricted to (lbs.):          No Pushing        No Climbing     No Reaching Above Shoulders       Date of Next Visit:  Thursday 6/5/2025  @ 9:00 AM  *Cancel if seen by orthopedics*  Date of this Exam: 5/13/2025 Physician/Chiropractic Physician Name: MICHAEL Gutierrez Physician/Chiropractic Physician Signature:  Alberto Santiago DO [facility]:  [address] [place] [address], [address] [place], [place] [address] - Telephone [phone] [place]:  [address] [place] [address], [address] [place], [place] [address] - Telephone [phone]    [email or web address]  D-39 (Rev. 10/24)

## 2025-05-13 NOTE — PROGRESS NOTES
Subjective:     Jose Alejandro Kilpatrick is a 30 y.o. male who presents for Follow-Up    DOI: 05/06/2025 LEXIE: Lifting A Tire with my Left Arm      Today patient states that he continues to experience intermittent sharp pain in his left bicep and forearm.  He states while the bruising is present it is improving to the area to his left medial forearm near his elbow.  He is concerned that he has had continued dimpling of his left bicep.  He states the pain is minimal unless he goes to lift anything or rotate his elbow.  He states he does get some occasional tingling down his arm.  He denies any numbness running down his arm or loss of  strength.  He states he has not had to take any over-the-counter Tylenol or Motrin since his initial visit, but he is using ice.  Patient denies any previous injury or trauma to this arm.  Patient is right arm dominant.  Patient denies second job.  He is tolerating light duty with minimal difficulty.  MRIs of the forearm and elbow requested.  Orthopedic referral placed for further evaluation and management of symptoms.       ROS: All systems were reviewed on intake form, form was reviewed and signed. See scanned documents in media. Pertinent positives and negatives included in HPI.    PMH: No pertinent past medical history to this problem  MEDS: Medications were reviewed in Epic  ALLERGIES: No Known Allergies  SOCHX: Works as sales and service at [employer]  FH: No pertinent family history to this problem       Objective:     There were no vitals taken for this visit.    [unfilled]    Left arm: Bruising and tenderness noted on left medial forearm near elbow.  Tenderness to palpation of medial epicondyles as well as bicep proximal bicep insertion with dimpling present.  Patient does have flexion of bicep and forearm however he states that elicits pain.   strength 5 out of 5.  Distal neurovascular intact.    Assessment/Plan:       1. Strain of left biceps, subsequent encounter  -  MR-ELBOW-W/O LEFT; Future  - WE-PHVVIVX-E/O LEFT; Future  - Referral to Radiology  - Referral to Orthopedics    2. Strain of left forearm, subsequent encounter  - MR-ELBOW-W/O LEFT; Future  - CR-VGFUQDT-C/O LEFT; Future  - Referral to Radiology  - Referral to Orthopedics    No use of the left upper extremity until cleared by orthopedics       Differential diagnosis, natural history, supportive care, and indications for immediate follow-up discussed.    Approximately 30 minutes were spent in reviewing notes, preparing for visit, obtaining history, exam and evaluation, patient counseling/education and post visit documentation/orders.

## 2025-06-05 ENCOUNTER — OCCUPATIONAL MEDICINE (OUTPATIENT)
Dept: OCCUPATIONAL MEDICINE | Facility: CLINIC | Age: 30
End: 2025-06-05
Payer: COMMERCIAL

## 2025-06-05 VITALS
OXYGEN SATURATION: 98 % | HEART RATE: 58 BPM | WEIGHT: 215 LBS | SYSTOLIC BLOOD PRESSURE: 132 MMHG | RESPIRATION RATE: 14 BRPM | DIASTOLIC BLOOD PRESSURE: 78 MMHG | TEMPERATURE: 98.3 F | HEIGHT: 67 IN | BODY MASS INDEX: 33.74 KG/M2

## 2025-06-05 DIAGNOSIS — S56.912D STRAIN OF LEFT FOREARM, SUBSEQUENT ENCOUNTER: ICD-10-CM

## 2025-06-05 DIAGNOSIS — S46.212D STRAIN OF LEFT BICEPS, SUBSEQUENT ENCOUNTER: Primary | ICD-10-CM

## 2025-06-05 PROCEDURE — 99213 OFFICE O/P EST LOW 20 MIN: CPT | Performed by: NURSE PRACTITIONER

## 2025-06-05 ASSESSMENT — ENCOUNTER SYMPTOMS
RESPIRATORY NEGATIVE: 1
CONSTITUTIONAL NEGATIVE: 1
CARDIOVASCULAR NEGATIVE: 1
SENSORY CHANGE: 0
MYALGIAS: 1
WEAKNESS: 1
PSYCHIATRIC NEGATIVE: 1
TINGLING: 1

## 2025-06-05 NOTE — PROGRESS NOTES
Subjective:     Jose Alejandro Kilpatrick is a 30 y.o. male who presents for Follow-Up    DOI: 05/06/2025 LEXIE: Lifting A Tire with my Left Arm      Today patient states that symptoms are unchanged since last visit.  He continues to experience intermittent sharp pain in his left bicep and forearm.  He states that the bruising has completely resolved.  He is concerned that he has had continued dimpling of his left bicep.  He states the pain is minimal unless he goes to lift anything or rotate his elbow.  He states he does get some occasional tingling down his arm.  He denies any numbness running down his arm or loss of  strength.  He states he has not had to take any over-the-counter Tylenol or Motrin since his initial visit, but he is using ice.  Patient denies any previous injury or trauma to this arm.  Patient is right arm dominant.  Patient denies second job.  He is tolerating light duty with minimal difficulty.  MRIs of the forearm and elbow and orthopedic referrals are currently pending.  Plan of care discussed with patient.    Review of Systems   Constitutional: Negative.    Respiratory: Negative.     Cardiovascular: Negative.    Musculoskeletal:  Positive for joint pain and myalgias.   Skin: Negative.    Neurological:  Positive for tingling and weakness. Negative for sensory change.   Psychiatric/Behavioral: Negative.         SOCHX: Works as sales and service at [employer]  FH: No pertinent family history to this problem       Objective:     There were no vitals taken for this visit.    Constitutional: Patient is in no acute distress. Appears well-developed and well-nourished.   Cardiovascular: Normal rate.    Pulmonary/Chest: Effort normal. No respiratory distress.   Neurological: Patient is alert and oriented to person, place, and time.   Skin: Skin is warm and dry.   Psychiatric: Normal mood and affect. Behavior is normal.     Left arm: Bruising and tenderness noted on left medial forearm near elbow.  Tenderness  to palpation of medial epicondyles as well as bicep proximal bicep insertion with dimpling present.  Patient does have flexion of bicep and forearm however he states that elicits pain.   strength 5 out of 5.  Distal neurovascular intact.    Assessment/Plan:       1. Strain of left biceps, subsequent encounter    2. Strain of left forearm, subsequent encounter    Follow-up in 4 weeks, unless seen by orthopedics  Continue with OTC Tylenol/ibuprofen if needed, ice/heat application, stretching and gentle ROM as tolerated  Return to clinic sooner if needed for further evaluation and management of new or worsening symptoms            Differential diagnosis, natural history, supportive care, and indications for immediate follow-up discussed.    Approximately 25 minutes was spent in preparing for visit, obtaining history, exam and evaluation, patient counseling/education and post visit documentation/orders.

## 2025-06-05 NOTE — LETTER
PHYSICIAN’S AND CHIROPRACTIC PHYSICIAN'S   PROGRESS REPORT   CERTIFICATION OF DISABILITY Claim Number:     Social Security Number:    Patient’s Name: Jose Alejandro Kilpatrick Date of Injury: 5/6/2025   Employer:  [employer]  Name of MCO (if applicable):      Patient’s Job Description/Occupation: Sales and Service       Previous Injuries/Diseases/Surgeries Contributing to the Condition:         Diagnosis: (S46.212D) Strain of left biceps, subsequent encounter  (primary encounter diagnosis)  (S56.912D) Strain of left forearm, subsequent encounter      Related to the Industrial Injury? Yes     Explain: DOI: 05/06/2025 LEXIE: Lifting A Tire with my Left Arm       Objective Medical Findings: Left arm: Bruising and tenderness noted on left medial forearm near elbow.  Tenderness to palpation of medial epicondyles as well as bicep proximal bicep insertion with dimpling present.  Patient does have flexion of bicep and forearm however he states that elicits pain.   strength 5 out of 5.  Distal neurovascular intact.         None - Discharged                         Stable  Yes                 Ratable  No        Generally Improved                         Condition Worsened               X   Condition Same  May Have Suffered a Permanent Disability No     Treatment Plan:    Follow-up in 4 weeks, unless seen by orthopedics  Continue with OTC Tylenol/ibuprofen if needed, ice/heat application, stretching and gentle ROM as tolerated  Return to clinic sooner if needed for further evaluation and management of new or worsening symptoms           No Change in Therapy                  PT/OT Prescribed                      Medication May be Used While Working        Case Management                          PT/OT Discontinued  X  Consultation    Further Diagnostic Studies:    Prescription(s) Orthopedics referral currently pending, transfer care    MRI requested          Released to FULL DUTY /No Restrictions on (Date):        Certified TOTALLY TEMPORARILY DISABLED (Indicate Dates) From:   To:    X  Released to RESTRICTED/Modified Duty on (Date): From: 6/5/2025 To:  07/03/2025  Restrictions Are:  Temporary      No Sitting    No Standing    No Pulling Other: No use of the left upper extremity until cleared by orthopedics       No Bending at Waist     No Stooping     No Lifting        No Carrying     No Walking Lifting Restricted to (lbs.):          No Pushing        No Climbing     No Reaching Above Shoulders       Date of Next Visit:   07/03/2025 @ 9:00 am Date of this Exam: 6/5/2025 Physician/Chiropractic Physician Name: MICHAEL Gutierrez Physician/Chiropractic Physician Signature:  Alberto Santiago DO MPH     [place]:  [address] [place] [address], [address] [place], [place] [address] - Telephone [phone] [place]:  [address] [place] [address], [address] [place], [place] [address] - Telephone [phone]    [email or web address]  D-39 (Rev. 10/24)

## 2025-07-02 ENCOUNTER — OCCUPATIONAL MEDICINE (OUTPATIENT)
Dept: OCCUPATIONAL MEDICINE | Facility: CLINIC | Age: 30
End: 2025-07-02
Payer: COMMERCIAL

## 2025-07-02 VITALS
RESPIRATION RATE: 16 BRPM | WEIGHT: 217.8 LBS | OXYGEN SATURATION: 98 % | DIASTOLIC BLOOD PRESSURE: 80 MMHG | HEIGHT: 67 IN | HEART RATE: 98 BPM | BODY MASS INDEX: 34.18 KG/M2 | SYSTOLIC BLOOD PRESSURE: 128 MMHG | TEMPERATURE: 97.4 F

## 2025-07-02 DIAGNOSIS — S46.212D STRAIN OF LEFT BICEPS, SUBSEQUENT ENCOUNTER: Primary | ICD-10-CM

## 2025-07-02 DIAGNOSIS — S46.212D RUPTURE OF LEFT BICEPS TENDON, SUBSEQUENT ENCOUNTER: ICD-10-CM

## 2025-07-02 DIAGNOSIS — S56.912D STRAIN OF LEFT FOREARM, SUBSEQUENT ENCOUNTER: ICD-10-CM

## 2025-07-02 PROCEDURE — 99213 OFFICE O/P EST LOW 20 MIN: CPT | Performed by: NURSE PRACTITIONER

## 2025-07-02 PROCEDURE — 3074F SYST BP LT 130 MM HG: CPT | Performed by: NURSE PRACTITIONER

## 2025-07-02 PROCEDURE — 3079F DIAST BP 80-89 MM HG: CPT | Performed by: NURSE PRACTITIONER

## 2025-07-02 ASSESSMENT — ENCOUNTER SYMPTOMS
TINGLING: 1
RESPIRATORY NEGATIVE: 1
WEAKNESS: 1
CONSTITUTIONAL NEGATIVE: 1
CARDIOVASCULAR NEGATIVE: 1
MYALGIAS: 1
PSYCHIATRIC NEGATIVE: 1
SENSORY CHANGE: 1

## 2025-07-02 NOTE — LETTER
PHYSICIAN’S AND CHIROPRACTIC PHYSICIAN'S   PROGRESS REPORT   CERTIFICATION OF DISABILITY Claim Number:     Social Security Number:    Patient’s Name: Jose Alejandro Kilpatrick Date of Injury: 5/6/2025   Employer:  [employer]  Name of MCO (if applicable):      Patient’s Job Description/Occupation: Sales and Service       Previous Injuries/Diseases/Surgeries Contributing to the Condition:         Diagnosis: (S46.212D) Strain of left biceps, subsequent encounter  (primary encounter diagnosis)  (S56.912D) Strain of left forearm, subsequent encounter  (S46.212D) Rupture of left biceps tendon, subsequent encounter      Related to the Industrial Injury? Yes     Explain: DOI: 05/06/2025 LEXIE: Lifting A Tire with my Left Arm       Objective Medical Findings: Left arm: Bruising and tenderness noted on left medial forearm near elbow.  Tenderness to palpation of medial epicondyles as well as bicep proximal bicep insertion with dimpling present.  Patient does have flexion of bicep and forearm however he states that elicits pain.   strength 5 out of 5.  Distal neurovascular intact.         None - Discharged                         Stable  Yes                 Ratable  No        Generally Improved                         Condition Worsened               X   Condition Same  May Have Suffered a Permanent Disability No     Treatment Plan:    Follow-up in 4 weeks, unless seen by orthopedics  Continue with OTC Tylenol/ibuprofen if needed, ice/heat application, stretching and gentle ROM as tolerated  Return to clinic sooner if needed for further evaluation and management of new or worsening symptoms              No Change in Therapy                  PT/OT Prescribed                      Medication May be Used While Working        Case Management                          PT/OT Discontinued  X  Consultation    Further Diagnostic Studies:    Prescription(s) Orthopedic appointment as scheduled, transfer care    MRI left forearm:  Rupture of bicep tendon from the radial tuberosity.  MRI left elbow: Complete rupture of the bicep tendon with 5.8 cm of retraction.          Released to FULL DUTY /No Restrictions on (Date):       Certified TOTALLY TEMPORARILY DISABLED (Indicate Dates) From:   To:    X  Released to RESTRICTED/Modified Duty on (Date): From: 7/2/2025 To:  07/30/2025  Restrictions Are:  Temporary      No Sitting    No Standing    No Pulling Other: No use of the left upper extremity until cleared by orthopedics       No Bending at Waist     No Stooping     No Lifting        No Carrying     No Walking Lifting Restricted to (lbs.):          No Pushing        No Climbing     No Reaching Above Shoulders       Date of Next Visit:   07/30/2025 @ 9:30 am Date of this Exam: 7/2/2025 Physician/Chiropractic Physician Name: MICHAEL Gutierrez Physician/Chiropractic Physician Signature:  Alberto Santiago DO MPH     [place]:  [address] [place] [address], [address] [place], [place] [address] - Telephone [phone] [place]:  [address] [place] [address], [address] [place], [place] [address] - Telephone [phone]    [email or web address]  D-39 (Rev. 10/24)

## 2025-07-02 NOTE — PROGRESS NOTES
"Subjective:     Jose Alejandro Kilpatrick is a 30 y.o. male who presents for Follow-Up    DOI: 05/06/2025 LEXIE: Lifting A Tire with my Left Arm      Today patient states that symptoms are unchanged since last visit.  He continues to experience intermittent sharp pain in his left bicep and forearm.  He states that the bruising has completely resolved.  He is concerned that he has had continued dimpling of his left bicep.  He states the pain is minimal unless he goes to lift anything or rotate his elbow.  He states he does get some occasional tingling down his arm.  He denies any numbness running down his arm or loss of  strength.  He states he has not had to take any over-the-counter Tylenol or Motrin since his initial visit, but he is using ice.  Patient denies any previous injury or trauma to this arm.  Patient is right arm dominant.  Patient denies second job.  He is tolerating light duty with minimal difficulty.  MRI results reviewed with patient.  The orthopedic referral has been approved pending scheduling.  Plan of care discussed with patient.    Review of Systems   Constitutional: Negative.    Respiratory: Negative.     Cardiovascular: Negative.    Musculoskeletal:  Positive for joint pain and myalgias.   Skin:         Faint bruising and swelling   Neurological:  Positive for tingling, sensory change and weakness.   Psychiatric/Behavioral: Negative.         SOCH[employer]: Works as sales and service at [employer]  FH: No pertinent family history to this problem       Objective:     /80 (BP Location: Left arm, Patient Position: Sitting, BP Cuff Size: Adult)   Pulse 98   Temp 36.3 °C (97.4 °F) (Temporal)   Resp 16   Ht 1.702 m (5' 7\")   Wt 98.8 kg (217 lb 12.8 oz)   SpO2 98%   BMI 34.11 kg/m²     Constitutional: Patient is in no acute distress. Appears well-developed and well-nourished.   Cardiovascular: Normal rate.    Pulmonary/Chest: Effort normal. No respiratory distress.   Neurological: Patient is alert and " oriented to person, place, and time.   Skin: Skin is warm and dry.   Psychiatric: Normal mood and affect. Behavior is normal.     Left arm: Bruising and tenderness noted on left medial forearm near elbow.  Tenderness to palpation of medial epicondyles as well as bicep proximal bicep insertion with dimpling present.  Patient does have flexion of bicep and forearm however he states that elicits pain.   strength 5 out of 5.  Distal neurovascular intact.    Assessment/Plan:       1. Strain of left biceps, subsequent encounter    2. Strain of left forearm, subsequent encounter    3. Rupture of left biceps tendon, subsequent encounter    Follow-up in 4 weeks, unless seen by orthopedics  Continue with OTC Tylenol/ibuprofen if needed, ice/heat application, stretching and gentle ROM as tolerated  Return to clinic sooner if needed for further evaluation and management of new or worsening symptoms               Differential diagnosis, natural history, supportive care, and indications for immediate follow-up discussed.    Approximately 25 minutes was spent in preparing for visit, obtaining history, exam and evaluation, patient counseling/education and post visit documentation/orders.